# Patient Record
Sex: FEMALE | Race: OTHER | Employment: FULL TIME | URBAN - METROPOLITAN AREA
[De-identification: names, ages, dates, MRNs, and addresses within clinical notes are randomized per-mention and may not be internally consistent; named-entity substitution may affect disease eponyms.]

---

## 2021-12-14 NOTE — TELEPHONE ENCOUNTER
Outside Covid Testing done    Results and RTW guidelines:    COVID RESULT reported:      Test type:    [x] Rapid outside         [] PCR outside    Date of test: 12/13/2021     Test location: [facility]         [] Result viewed in Epic with verbal consent received and if symptoms worsen     Notes:     RTW PLAN:    [x] RTW 10 days with clearance from [facility]- call for appt 2 days prior to RTW date OR when feeling well enough to RTW (see guidelines above)    [] RTW immediately, continue to monitor for sx  [] RTW when sx im Yes []        • Loss of Smell              Yes []       •  Loss of Taste             Yes []       • Sore throat                 Yes [x]    Has resolved   • Fatigue                        Yes [x]    Has resolved   • Body Aches                Yes

## 2022-04-09 PROBLEM — L50.9 URTICARIA: Status: RESOLVED | Noted: 2021-10-21 | Resolved: 2022-04-09

## 2022-04-09 PROBLEM — R87.610 ASCUS WITH POSITIVE HIGH RISK HPV CERVICAL: Status: RESOLVED | Noted: 2021-10-29 | Resolved: 2022-04-09

## 2022-04-09 PROBLEM — L50.9 URTICARIA: Status: ACTIVE | Noted: 2021-10-21

## 2022-04-09 PROBLEM — R87.810 ASCUS WITH POSITIVE HIGH RISK HPV CERVICAL: Status: RESOLVED | Noted: 2021-10-29 | Resolved: 2022-04-09

## 2022-04-09 PROBLEM — R87.610 ASCUS WITH POSITIVE HIGH RISK HPV CERVICAL: Status: ACTIVE | Noted: 2021-10-29

## 2022-04-09 PROBLEM — R87.810 ASCUS WITH POSITIVE HIGH RISK HPV CERVICAL: Status: ACTIVE | Noted: 2021-10-29

## 2022-04-09 PROBLEM — E66.9 OBESITY, CLASS II, BMI 35-39.9: Status: ACTIVE | Noted: 2022-04-09

## 2022-04-09 NOTE — PATIENT INSTRUCTIONS
- We will start you on antibiotics (azithromycin). Take as prescribed  - You can continue with Dayquil/Val[place]  - You will need to stop this medication if you become pregnant again in the future  - Ears flushed today  - Use over the counter Debrox drops to help soften wax in the future  - Phentermine prescribed for weight. Use [employer] discount/jose as insurance does not cover it  - Follow up in 3 months for weight check/chronic issues; follow up earlier as needed. It was a pleasure seeing you in the clinic today. Thank you for choosing the [facility] office for your healthcare needs. Please call at [phone] with any questions or concerns.     Ana De Leon MD

## 2022-06-15 ENCOUNTER — OFFICE VISIT (OUTPATIENT)
Dept: INTERNAL MEDICINE CLINIC | Facility: CLINIC | Age: 27
End: 2022-06-15
Payer: MEDICAID

## 2022-06-15 VITALS
DIASTOLIC BLOOD PRESSURE: 60 MMHG | HEIGHT: 65.67 IN | HEART RATE: 76 BPM | RESPIRATION RATE: 15 BRPM | OXYGEN SATURATION: 99 % | SYSTOLIC BLOOD PRESSURE: 124 MMHG | WEIGHT: 212.38 LBS | BODY MASS INDEX: 34.54 KG/M2 | TEMPERATURE: 99 F

## 2022-06-15 DIAGNOSIS — M25.511 CHRONIC PAIN OF BOTH SHOULDERS: ICD-10-CM

## 2022-06-15 DIAGNOSIS — E66.9 OBESITY, CLASS I, BMI 30-34.9: ICD-10-CM

## 2022-06-15 DIAGNOSIS — G89.29 CHRONIC PAIN OF BOTH SHOULDERS: ICD-10-CM

## 2022-06-15 DIAGNOSIS — M25.512 CHRONIC PAIN OF BOTH SHOULDERS: ICD-10-CM

## 2022-06-15 DIAGNOSIS — N62 MACROMASTIA: Primary | ICD-10-CM

## 2022-06-15 DIAGNOSIS — M54.9 UPPER BACK PAIN: ICD-10-CM

## 2022-06-15 PROCEDURE — 3008F BODY MASS INDEX DOCD: CPT | Performed by: INTERNAL MEDICINE

## 2022-06-15 PROCEDURE — 3074F SYST BP LT 130 MM HG: CPT | Performed by: INTERNAL MEDICINE

## 2022-06-15 PROCEDURE — 99214 OFFICE O/P EST MOD 30 MIN: CPT | Performed by: INTERNAL MEDICINE

## 2022-06-15 PROCEDURE — 3078F DIAST BP <80 MM HG: CPT | Performed by: INTERNAL MEDICINE

## 2022-06-15 RX ORDER — ERGOCALCIFEROL 1.25 MG/1
50000 CAPSULE ORAL WEEKLY
COMMUNITY
Start: 2022-05-17 | End: 2022-06-15

## 2022-06-15 NOTE — TELEPHONE ENCOUNTER
She can see if Dr. Willie Bhandari partner Dr. João Toribio has any earlier openings    Otherwise she can see if these providers accept her insurance and can see her sooner (I will need to enter a new referral order if she sees one of these specialists)  Shyam Aaron, [address] [facility], [place], [address]  Phone: [phone] [facility],   [address] [facility] [address] [facility]  Phone: [phone]    Candie Reyes MD  Plastic Surgery  [address] [place], [place], [address]  Phone: [phone]    Jessa Oconnell MD  [address] [facility] [address]  Phone: [phone]

## 2022-06-15 NOTE — PATIENT INSTRUCTIONS
- Follow up with Dr. Alex Hendricks to discuss surgery options   [address] [facility], [address]  [phone]  - Down 6 lbs from last visit. We will continue phentermine for now  - Follow up in 3 months for weight re-check/to see if we want to continue phentermine    It was a pleasure seeing you in the clinic today. Thank you for choosing the [facility] office for your healthcare needs. Please call at [phone] with any questions or concerns.     Sabrina Johnson MD

## 2022-06-15 NOTE — TELEPHONE ENCOUNTER
Pt stated that Dr Esthela Castro does not have any appointments until April 2023. Pt is looking for another referral for breast reconstruction surgery.      Confirmed [phone] as best call back

## 2022-06-16 NOTE — TELEPHONE ENCOUNTER
Pt stated she would like to have referral for Dr. Elisa Felton.      Becca Maddox MD  Plastic Surgery  [facility] [place]  [facility], [facility] [address]  Phone: [phone]

## 2022-07-18 DIAGNOSIS — E66.9 OBESITY, CLASS II, BMI 35-39.9: ICD-10-CM

## 2022-07-18 RX ORDER — PHENTERMINE HYDROCHLORIDE 37.5 MG/1
37.5 TABLET ORAL
Qty: 30 TABLET | Refills: 2 | Status: SHIPPED | OUTPATIENT
Start: 2022-07-18

## 2022-07-18 NOTE — TELEPHONE ENCOUNTER
Pt requesting refill for medication, almost out of medication.  Oscar #[id number]    Phentermine HCl 37.5 MG Oral Tab 30 tablet

## 2022-07-18 NOTE — TELEPHONE ENCOUNTER
LOV: 6/15/2022 with Dr. Mo Rosenberg  RTC: 3 months  Last Relevant Labs: October 2021  Filled: 4/9/2022   #30 with 2 refills    No future appointments.

## 2022-07-19 NOTE — TELEPHONE ENCOUNTER
Benzonatate sent to [place], if it is not covered by insurance she can check [employer] jose to see where it is cheapest out of pocket

## 2022-07-19 NOTE — TELEPHONE ENCOUNTER
Pt stated she has been experiencing dry cough. Per pt her cough is triggered by consuming cold food/drink. Pt is requesting prescription for her cough. Pt reports no other symptoms and feels fine other than cough.      Confirmed [phone]

## 2022-10-12 NOTE — TELEPHONE ENCOUNTER
Appt scheduled.   Future Appointments   Date Time Provider Faiza Woodalli   10/17/2022  9:40 AM EMPL [facility] [address] [facility] VAC EDW Empl   10/22/2022  8:00 AM Fannie Valencia MD EMG 8 [facility]G [place]   11/12/2022  8:30 AM WOLF Piña EMG OB/GYN N EMG [place]     Patient is requesting lab orders for physical.

## 2022-10-22 NOTE — PATIENT INSTRUCTIONS
- Take daily over the counter vitamin D (1000 - 2000 units) for the winter months  - Focus on healthy diet (watch saturated fats) for cholesterol  - Start fluticasone nasal spray for ear fullness. Take daily for 1 month. - Follow up in January/February. It was a pleasure seeing you in the clinic today. Thank you for choosing the [facility] office for your healthcare needs. Please call at [phone] with any questions or concerns.     Clayton Roe MD

## 2023-02-02 NOTE — TELEPHONE ENCOUNTER
Pt requesting a referral for Dermatologist due to recurrent acne. Pt states she doesn't notice improvement when using the face wash Dr. Jaime Valles recommended. Pt requesting prescription for pills that were discussed during last ofv, preferably ones that are covered under medicaid or have a low cost with a Rx coupon. ASSESSMENT AND PLAN:   1. Acne vulgaris  Patient has had issues with acne for past 5-6 years if not longer (since high school years). Has tried OTC therapies with no improvement. Recurrent acne on both cheeks. Will start on benzoyl peroxide-erythromycin ointment. If that does not help, will consider oral doxycycline. If symptoms persist will have patient follow up with Dermatology - information provided for in-network providers.

## 2023-02-03 NOTE — TELEPHONE ENCOUNTER
Doxycycline sent to pharmacy.   As far as dermatologist per insurance website these are in network options    Dr. Radha Lantigua [facility] [address]  Phone: [phone]    Dr. Anju Nguyen [phone], [facility]  Phone: [phone]    Dr. Beth Thibodeaux  [address] [place] [address], [facility] [address]  Phone: [phone]

## 2023-02-08 NOTE — TELEPHONE ENCOUNTER
It is not covered by her insurance, but if she wants to self-pay for it I can send it in. She should still also schedule follow up with Dermatology.

## 2023-02-08 NOTE — TELEPHONE ENCOUNTER
Pt ok with paying out of pocket-would like Rx sent to [facility]. Did not pend-unsure which strength you wanted to order.     Nithya Dur[id number]  [address], [facility] [place] [facility]

## 2023-02-08 NOTE — TELEPHONE ENCOUNTER
Pt requesting new prescription for Tretinoin, to assist with acne scarring. Pt received recommendations by a friend.  #[id number]

## 2023-02-09 NOTE — TELEPHONE ENCOUNTER
Patient using good rx coupon at [facility], would prefer smaller tub as it is less expensive. Pended rx.

## 2023-02-13 NOTE — TELEPHONE ENCOUNTER
Pt attempted to refill medication through pharmacy but too early to fill. Pt is leaving to [facility] [place] on Thursday and would like to have medication with her during the duration of her trip which she will be on vacation for 2 weeks.  [address] #[id number]

## 2023-04-03 NOTE — TELEPHONE ENCOUNTER
Refill request    Phentermine HCl 37.5 MG Oral Tab    [facility][id number] - [place] [id number] AT [address] [facility], [phone], [phone] [address] [place] [address]   Phone: [phone] Fax: [phone]

## 2023-08-24 NOTE — TELEPHONE ENCOUNTER
Pt scheduled VV for Tuesday at noon. [address] [address] for VV? Unable to take off school. Pt experiencing SOB/Chest pain x2 weeks.      Please advise

## 2023-12-11 NOTE — PATIENT INSTRUCTIONS
- Start antibiotic eye drops. Apply 3-4 times daily in left eye  - Work note written for today    It was a pleasure seeing you in the clinic today. Thank you for choosing the [facility] office for your healthcare needs. Please call at [phone] with any questions or concerns.     Clayton Roe MD

## 2024-03-04 NOTE — TELEPHONE ENCOUNTER
I can prescribed Zofran,  she could try OTC Imodium or Pepto-Bismol.  Recommend she do a home COVID test as well, sometimes that can cause GI issues

## 2024-03-04 NOTE — TELEPHONE ENCOUNTER
RP- please advise- any further recommendations? Zofran?       History as listed in previous note with addition of following:   -Onset 4 hours after eating \"wings/empanada-along with alcohol at 8:30pm\" past Saturday.  -Diarrhea (full liquid) episodes have \"decreased to once every hour- and appear to be less fluid now\".  Denies any lightheadedness/dizziness- taking oral fluids without difficulty. No appetite, just liquids at this point.  No blood in diarrhea  -had 2 episodes of vomiting last night but none since.  Does still feel \"nauseated\"      -complaint of intermittent mid epigastric ache, rated as \"7\", improved/resolved temporarily with each diarrhea episode.  Does feel bloated.     Discussed:  -proper fluid management/replacement   -proceed to immediate care if fever, dizziness develops and/or abdominal ache worsens or if diarrhea episodes increase in number.  Patient verbalized understanding.

## 2024-03-04 NOTE — TELEPHONE ENCOUNTER
Ondansetron prescription signed, since she doesn't have insurance she may want to use [employer] jose if it is expensive out of pocket

## 2024-03-04 NOTE — TELEPHONE ENCOUNTER
Spoke with patient via phone.  Shared RP's comments/recommendations.  Patient verbalized understanding.  Has only had \"2-episodes of diarrhea since our earlier call at 11:30ish\".  Patient confirms she will take a home COVID test    RP- patient would like Zofran prescription \"just in case\"- pended for your review (please check quantity/refill as this was default order)

## 2024-03-04 NOTE — TELEPHONE ENCOUNTER
Patient c/o   Nausea  Vomiting   Watery, loose diarrhea   Onset Saturday x2 days  Denies fever, body aches   Not eating food or soup d/t loss of appetite   Is drinking Pedialyte and tolerating  Has not tried anything else OTC     Declined OV, does not have insurance at this time.  Asks for rec and rx if appropriate [facility] #[id number]

## 2024-03-11 NOTE — TELEPHONE ENCOUNTER
Pt requesting refill of medication due to acne flare up which is uncomfortable. [id number]    Doxycycline Monohydrate 100 MG Oral Cap 30 capsule

## 2024-03-11 NOTE — TELEPHONE ENCOUNTER
LOV: 12/11/2023 with Dr. Valencia  RTC: 6-12 months  Last Relevant Labs: 10/14/2022  Filled: 10/5/2023    #30 with 2 refills    No future appointments.

## 2024-04-12 NOTE — TELEPHONE ENCOUNTER
Pt is asking for an antibiotic due to being exposed to strep throat. She is having sx of severe throat pain, headaches. Pt was was exposed while at work to a pt with strep throat yesterday and started sx's shortly after being exposed. Oscar #[id number]    Ok to provide abx?

## 2024-07-22 NOTE — TELEPHONE ENCOUNTER
From  Nupur Abernathy RN To  Alyse Andre, Jazmín SANTANA Sent and Delivered  7/19/2024  3:45 PM   Last Read in MyChart  7/19/2024  5:18 PM by Jazmín Andre

## 2024-11-22 NOTE — TELEPHONE ENCOUNTER
Protocol NONE    Requesting: polymyxin B-trimethoprim 27781-0.1 UNIT/ML-% Ophthalmic Solution     LOV: 12/11/23  RTC: NONE  Filled: 12/11/23 10ML 0 REFILL  Recent Labs:   4/26/23  Upcoming OV   No future appointments.

## (undated) NOTE — LETTER
Date: 12/11/2023    Patient Name: Wilfred Holbrook    To Whom it may concern: The above patient was seen at the [facility] for treatment of a medical condition. This patient should be excused from attending work today, 12/11/23.     Sincerely,      Calvin García MD